# Patient Record
Sex: MALE | Race: WHITE | URBAN - METROPOLITAN AREA
[De-identification: names, ages, dates, MRNs, and addresses within clinical notes are randomized per-mention and may not be internally consistent; named-entity substitution may affect disease eponyms.]

---

## 2024-02-08 ENCOUNTER — OFFICE (OUTPATIENT)
Dept: URBAN - METROPOLITAN AREA CLINIC 19 | Facility: CLINIC | Age: 29
End: 2024-02-08

## 2024-02-08 VITALS
HEART RATE: 58 BPM | OXYGEN SATURATION: 97 % | SYSTOLIC BLOOD PRESSURE: 126 MMHG | WEIGHT: 218 LBS | DIASTOLIC BLOOD PRESSURE: 83 MMHG | HEIGHT: 75 IN

## 2024-02-08 DIAGNOSIS — Z86.010 PERSONAL HISTORY OF COLONIC POLYPS: ICD-10-CM

## 2024-02-08 DIAGNOSIS — Z83.719 FAMILY HISTORY OF COLON POLYPS, UNSPECIFIED: ICD-10-CM

## 2024-02-08 DIAGNOSIS — K92.1 MELENA: ICD-10-CM

## 2024-02-08 DIAGNOSIS — K64.4 RESIDUAL HEMORRHOIDAL SKIN TAGS: ICD-10-CM

## 2024-02-08 PROCEDURE — 99203 OFFICE O/P NEW LOW 30 MIN: CPT

## 2024-02-08 RX ORDER — POLYETHYLENE GLYCOL 3350, SODIUM SULFATE, POTASSIUM CHLORIDE, MAGNESIUM SULFATE, AND SODIUM CHLORIDE FOR ORAL SOLUTION 178.7-7.3G
KIT ORAL
Qty: 1 | Refills: 0 | Status: ACTIVE
Start: 2024-02-08

## 2024-02-08 NOTE — SERVICENOTES
Procedure and risks including but not limited to anesthesia, bleeding perforation, etc. were discussed with the patient in detail.

Juliet rendon MD Addendum: IKatarzyna MD, independently interviewed and examined this patient and made modifications to the final HPI, exam, impression, and plan as initially scribed by Otilia Quinonez NP.

## 2024-02-08 NOTE — SERVICEHPINOTES
28-year-old male here with concerns of rectal bleeding.  Reports bright red blood when he wipes for several years.  Bowel movements are regular and he denies a sense of incomplete evacuation or hard stool or straining.  Denies any abdominal pain or any upper GI symptoms or any abnormal weight loss.  Father with colon polyps at age 46. Maternal grandfather with colon cancer.  No family history of IBD.  No cardiac issues, not taking any blood thinners or weight loss medications.  Uses a nicotine vape daily as well as THC vape.  Outside colonoscopy in February 2020 was exam to the cecum with good bowel prep.  One small tubular adenoma was removed.

## 2024-02-09 LAB
CBC, PLATELET, NO DIFFERENTIAL: HEMATOCRIT: 49.1 % (ref 37.5–51)
CBC, PLATELET, NO DIFFERENTIAL: HEMOGLOBIN: 16.4 G/DL (ref 13–17.7)
CBC, PLATELET, NO DIFFERENTIAL: MCH: 30.1 PG (ref 26.6–33)
CBC, PLATELET, NO DIFFERENTIAL: MCHC: 33.4 G/DL (ref 31.5–35.7)
CBC, PLATELET, NO DIFFERENTIAL: MCV: 90 FL (ref 79–97)
CBC, PLATELET, NO DIFFERENTIAL: NRBC: (no result)
CBC, PLATELET, NO DIFFERENTIAL: PLATELETS: 221 X10E3/UL (ref 150–450)
CBC, PLATELET, NO DIFFERENTIAL: RBC: 5.44 X10E6/UL (ref 4.14–5.8)
CBC, PLATELET, NO DIFFERENTIAL: RDW: 12.1 % (ref 11.6–15.4)
CBC, PLATELET, NO DIFFERENTIAL: WBC: 5.8 X10E3/UL (ref 3.4–10.8)